# Patient Record
(demographics unavailable — no encounter records)

---

## 2024-11-25 NOTE — PROCEDURE
[FreeTextEntry1] : Procedure Performed: sclerotherapy.   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 He was planned for localized sclerotherapy injections. After explaining risks and benefits, informed consent was obtained. All questions answered.   Procedure Note: The patient was brought into the examination room and placed supine on procedure table. Left lower extremity was cleaned with isopropyl alcohol. 1% Asclera was injected using a sterile 1cc syringe with 30 gauge 1/2 inch needle. Sclerosant was injected into the left lower extremity symptomatic, reticular veins and injections were mostly localized to the medial ankle. At completion, treated area was dressed with an ace compression wrap. Patient tolerated the procedure well with no complications. Patient advised to wear compression stockings for 2 weeks.

## 2024-11-25 NOTE — PHYSICAL EXAM
[Normal Breath Sounds] : Normal breath sounds [Normal Rate and Rhythm] : normal rate and rhythm [2+] : left 2+ [Varicose Veins Of Lower Extremities] : bilaterally [Ankle Swelling On The Left] : moderate [No Rash or Lesion] : No rash or lesion [Alert] : alert [Calm] : calm [Ankle Swelling (On Exam)] : not present [] : not present [Skin Ulcer] : no ulcer [de-identified] : No acute distress noted [de-identified] : No palpable cords.  No calf tenderness. [de-identified] : Intact

## 2024-11-25 NOTE — PHYSICAL EXAM
[Normal Breath Sounds] : Normal breath sounds [Normal Rate and Rhythm] : normal rate and rhythm [2+] : left 2+ [Varicose Veins Of Lower Extremities] : bilaterally [Ankle Swelling On The Left] : moderate [No Rash or Lesion] : No rash or lesion [Alert] : alert [Calm] : calm [Ankle Swelling (On Exam)] : not present [] : not present [Skin Ulcer] : no ulcer [de-identified] : No acute distress noted [de-identified] : No palpable cords.  No calf tenderness. [de-identified] : Intact

## 2024-11-25 NOTE — HISTORY OF PRESENT ILLNESS
[FreeTextEntry1] : Patient is a 33-year-old male with history significant for type 2 diabetes who presents to the office today for evaluation of bleeding varicosities in the left lower extremity.  Patient reports several episodes of bleeding from the veins over the years with 3 occurring this month.  Patient was seen at [facility] ER 2 weeks ago. During this visit, area was dressed withTXA soaked gauze and Raghu.  Denies fever or chills.  Denies chest pain or claudication symptoms.  Denies tissue loss.  No history of MI or CVA.  No history of DVT or PE.  No history of smoking.

## 2024-11-25 NOTE — HISTORY OF PRESENT ILLNESS
[FreeTextEntry1] : Patient is a 33-year-old male with history significant for type 2 diabetes who presents to the office today for evaluation of bleeding varicosities in the left lower extremity.  Patient reports several episodes of bleeding from the veins over the years with 3 occurring this month.  Patient was seen at [facility] ER 2 weeks ago. During this visit, area was dressed withTXA soaked gauze and Raghu.  Denies fever or chills.  Denies chest pain or claudication symptoms.  Denies tissue loss.  No history of MI or CVA.  No history of DVT or PE.  No history of smoking.

## 2024-11-25 NOTE — ASSESSMENT
[FreeTextEntry1] : 33-year-old male with bleeding varicosities of the left lower extremity.  Pedal pulses are intact bilaterally.  There is no evidence of significant arterial sufficiency at this time.  Duplex demonstrates severe venous insufficiency throughout the left greater saphenous vein with no evidence of DVT or SVT. In the right lower extremity venous insufficiency is limited through the distal calf segments of the greater saphenous vein also with no evidence of DVT or SVT.   Patient with bleeding varicosities secondary to severe venous insufficiency that is now interfering with his daily life and refractory to conservative management. Treatment is indicated now for non-cosmetic reasons for symptomatic venous reflux disease. Recommend radiofrequency ablation of the left greater saphenous vein with possible stab phlebectomy.   The risks and benefits of endovenous ablation of saphenous vein versus conservative treatment was discussed with the patient. Patient chooses to proceed with the procedure. Treatment plan to be scheduled.

## 2025-01-02 NOTE — HISTORY OF PRESENT ILLNESS
[FreeTextEntry1] : Star presents to the office today for evaluation of chronic stomach issues.  His mother, who is a patient of this office, advised him to follow-up with GI.  The patient's main reason for the appointment today is that when he moves his bowels, it is a "biohazard" with a foul smell that permeates through the whole house.  In addition, the patient reports intermittent bloating.  The bloating does not appear related to oral intake and improves when he passes gas.  He has tried Gas-X in the past without relief.  The patient denies any dysphagia or nausea and moves his bowels almost daily.  In the past few days, he also notes nonradiating right-sided flank pain which is also not affected by his eating.  He denies any other abdominal pain.  The patient has a history of IDDM  and was diagnosed with diabetes about 4 years ago when his sugars were in the 700s.  He is currently taking insulin and was started on Ozempic last year.  He has not been taking Ozempic consistently but has noted more than 10 pound weight loss.

## 2025-01-02 NOTE — PHYSICAL EXAM
[Alert] : alert [Sclera] : the sclera and conjunctiva were normal [Hearing Threshold Finger Rub Not Boyle] : hearing was normal [Normal Appearance] : the appearance of the neck was normal [No Respiratory Distress] : no respiratory distress [Auscultation Breath Sounds / Voice Sounds] : lungs were clear to auscultation bilaterally [Heart Rate And Rhythm] : heart rate was normal and rhythm regular [Normal S1, S2] : normal S1 and S2 [None] : no edema [Bowel Sounds] : normal bowel sounds [Abdomen Tenderness] : non-tender [No Masses] : no abdominal mass palpated [Abdomen Soft] : soft [Supraclavicular Lymph Nodes Enlarged Bilaterally] : no supraclavicular lymphadenopathy [No CVA Tenderness] : no CVA  tenderness [Abnormal Walk] : normal gait [Normal Color / Pigmentation] : normal skin color and pigmentation [No Focal Deficits] : no focal deficits [Oriented To Time, Place, And Person] : oriented to person, place, and time

## 2025-01-02 NOTE — ASSESSMENT
[FreeTextEntry1] : 1.  Bloating, flatulence, foul-smelling stool.  At risk for SIBO given history of diabetes.  May be due to H. pylori or ingestion of FODMAPs.  Rule out celiac disease. 2.  Right flank pain.  Differential includes musculoskeletal etiology versus biliary colic. 3.  Morbid obesity (BMI: 63) on Ozempic. 4.  IDDM.  Recs:  - Routine labs, H. pylori breath test ordered. - Check RUQ ultrasound. - If H. pylori breath test negative, will consider testing and/or treatment for SIBO. - Patient was advised that in absence of red flag symptoms, endoscopic evaluation would likely be low yield for his current symptoms. - Patient was advised to use Ozempic consistently.  If he is not able to lose sufficient weight with diet and exercise alone, he was advised to consider evaluation with a bariatric surgeon.

## 2025-02-05 NOTE — ASSESSMENT
[Arterial/Venous Disease] : arterial/venous disease [FreeTextEntry1] : bleeding varicosities in the left lower extremity. Patient reports several episodes of bleeding from the veins over the years with 3 occurring this month. plan for radiofrequency ablation

## 2025-02-05 NOTE — REASON FOR VISIT
[Other ___] : a [unfilled] visit for [FreeTextEntry2] : LEFT LEG RADIOFREQUENCY ABLATION for venous insuffiency

## 2025-02-05 NOTE — HISTORY OF PRESENT ILLNESS
[FreeTextEntry1] : alert and oriented accompanied by wife Latonya Enriquez [phone]  feels ok no insulin today c/o bleeding from right leg varicose veins, went to [facility] ER, ankle wrapped [FreeTextEntry5] : 1000pm [FreeTextEntry6] : Dr Mehta

## 2025-02-05 NOTE — PAST MEDICAL HISTORY
[Varicose Veins] : Varicose Veins [No therapy indicated for cases scheduled for less than one hour] : No therapy indicated for cases scheduled for less than one hour. [Obesity: BMI >25] : Obesity: BMI >25 [FreeTextEntry1] : Malignant Hyperthermia Screening Tool and Risk of Bleeding Assessment  Mr. JORGITO CHILEL denies family history of unexpected death following Anesthesia or Exercise. Denies Family history of Malignant Hyperthermia, Muscle or Neuromuscular disorder and High Temperature following exercise.  Mr. JORGITO CHILEL denies history of Muscle Spasm, Dark or Chocolate - Colored urine and Unanticipated fever immediately following anesthesia or serious exercise.  Mr. CHILEL also denies bleeding tendencies/ Risks of Bleeding.

## 2025-02-05 NOTE — HISTORY OF PRESENT ILLNESS
[FreeTextEntry1] : alert and oriented accompanied by wife Latonya Enriquez [phone]  feels ok no insulin today c/o bleeding from right leg varicose veins, went to [facility] ER, ankle wrapped [FreeTextEntry5] : 1000pm [FreeTextEntry6] : Dr Mehta

## 2025-02-05 NOTE — HISTORY OF PRESENT ILLNESS
[FreeTextEntry1] : alert and oriented accompanied by wife Latonya Enriquez [phone]  feels ok no insulin today c/o bleeding from right leg varicose veins, went to [facility] ER, ankle wrapped [FreeTextEntry5] : 1000pm [FreeTextEntry6] : Dr Mehta

## 2025-02-05 NOTE — PROCEDURE
[D/C IV on discharge] : D/C IV on discharge [Resume diet] : resume diet [Dressing checked for bleeding] : Dressing checked for bleeding [Vital signs on admission the q 15 mins x2] : Vital signs on admission the q 15 mins x2 [FreeTextEntry1] : left leg radiofrequency ablation

## 2025-02-14 NOTE — REASON FOR VISIT
[de-identified] : Radiofrequency ablation of left greater saphenous vein [de-identified] : s/p ablation. Denies fever or chills. Denies chest pain or shortness of breath. Patient reports pain is well controlled.

## 2025-02-14 NOTE — DISCUSSION/SUMMARY
[FreeTextEntry1] : Duplex demonstrates successful ablation of left greater saphenous vein with no evidence of deep vein thrombosis.  Follow-up 1 month.

## 2025-03-12 NOTE — HISTORY OF PRESENT ILLNESS
[FreeTextEntry1] : Patient is a 33-year-old male with history significant for type 2 diabetes and bleeding varicosities now s/p radiofrequency ablation of left greater saphenous who presents to the office today for follow-up.  Denies fever or chills.  Denies chest pain or claudication symptoms.  Denies tissue loss.

## 2025-03-12 NOTE — ASSESSMENT
[FreeTextEntry1] : 33-year-old male with bleeding varicosities of the left lower extremity s/p radiofrequency ablation of left greater saphenous  Pedal pulses are intact bilaterally.  There is no evidence of significant arterial sufficiency at this time.  Duplex today shows venous insufficiency limited to the distal calf segment of the right greater saphenous vein with no evidence of deep vein thrombosis or superficial phlebitis.  Recommend conservative management with compression and leg elevation.  No further vascular intervention is required.  Patient to follow-up as needed.

## 2025-03-12 NOTE — PHYSICAL EXAM
[Normal Breath Sounds] : Normal breath sounds [Normal Rate and Rhythm] : normal rate and rhythm [2+] : left 2+ [Varicose Veins Of Lower Extremities] : bilaterally [Ankle Swelling On The Left] : moderate [No Rash or Lesion] : No rash or lesion [Alert] : alert [Calm] : calm [Ankle Swelling (On Exam)] : not present [] : not present [Skin Ulcer] : no ulcer [de-identified] : No acute distress noted [de-identified] : No palpable cords.  No calf tenderness. [de-identified] : Intact

## 2025-03-12 NOTE — PHYSICAL EXAM
[Normal Breath Sounds] : Normal breath sounds [Normal Rate and Rhythm] : normal rate and rhythm [2+] : left 2+ [Varicose Veins Of Lower Extremities] : bilaterally [Ankle Swelling On The Left] : moderate [No Rash or Lesion] : No rash or lesion [Alert] : alert [Calm] : calm [Ankle Swelling (On Exam)] : not present [] : not present [Skin Ulcer] : no ulcer [de-identified] : No acute distress noted [de-identified] : No palpable cords.  No calf tenderness. [de-identified] : Intact

## 2025-06-24 NOTE — ASSESSMENT
[Arterial/Venous Disease] : arterial/venous disease [FreeTextEntry1] : 33-year-old male with bleeding varicosities of the left lower extremity s/p radiofrequency ablation of left greater saphenous  Pedal pulses are intact bilaterally.  There is no evidence of significant arterial sufficiency at this time.  Duplex today shows venous insufficiency limited to the distal calf segment of the right greater saphenous vein with no evidence of deep vein thrombosis or superficial phlebitis.  Patient is status post sclerotherapy for bleeding reticular veins of the right lower extremity. Continue compression stockings and elevation.  Patient to follow-up in 6 weeks.

## 2025-06-24 NOTE — CONSULT LETTER
[Dear  ___] : Dear  [unfilled], [Consult Letter:] : I had the pleasure of evaluating your patient, [unfilled]. [Please see my note below.] : Please see my note below. [Consult Closing:] : Thank you very much for allowing me to participate in the care of this patient.  If you have any questions, please do not hesitate to contact me. [Sincerely,] : Sincerely, [FreeTextEntry3] : VALERIA Ladd MD

## 2025-06-24 NOTE — PHYSICAL EXAM
[Normal Breath Sounds] : Normal breath sounds [Normal Rate and Rhythm] : normal rate and rhythm [2+] : left 2+ [Varicose Veins Of Lower Extremities] : bilaterally [Ankle Swelling On The Left] : moderate [No Rash or Lesion] : No rash or lesion [Alert] : alert [Calm] : calm [Ankle Swelling (On Exam)] : not present [] : not present [Skin Ulcer] : no ulcer [de-identified] : No acute distress noted [de-identified] : No palpable cords.  No calf tenderness. [de-identified] : Intact

## 2025-06-24 NOTE — HISTORY OF PRESENT ILLNESS
[FreeTextEntry1] : Patient is a 34-year-old male with history significant for type 2 diabetes and bleeding varicosities now s/p radiofrequency ablation of left greater saphenous who presents to the office today for follow-up. Patient reports 2 episodes of bleeding varicosities in the right lower extremity.  Denies fever or chills.  Denies chest pain or claudication symptoms.  Denies tissue loss.

## 2025-06-24 NOTE — PROCEDURE
[FreeTextEntry1] :  Sclerotherapy Indications: This is a 34-year-old male with bleeding reticular veins of the right lower extremity.  He was planned for localized sclerotherapy injection. After explaining risks and benefits, informed consent was obtained. All questions answered.   Procedure Note: The patient was brought into the examination room and placed prone on procedure table.The rightlower extremity was cleaned with isopropyl alcohol. 1% Asclera was injected using a sterile 1cc syringe with 30 gauge 1/2 inch needle. Sclerosant was injected into the right lower extremity symptomatic, bleeding reticular veins and injections were mostly localized to the lateral lower calf. At completion, treated area was dressed with an ace compression wrap. Patient tolerated the procedure well with no complications. Patient advised to wear compression stockings for minimum of 2 to 3 weeks and refrain from direct exposure to sunlight.

## 2025-06-24 NOTE — PHYSICAL EXAM
[Normal Breath Sounds] : Normal breath sounds [Normal Rate and Rhythm] : normal rate and rhythm [2+] : left 2+ [Varicose Veins Of Lower Extremities] : bilaterally [Ankle Swelling On The Left] : moderate [No Rash or Lesion] : No rash or lesion [Alert] : alert [Calm] : calm [Ankle Swelling (On Exam)] : not present [] : not present [Skin Ulcer] : no ulcer [de-identified] : No acute distress noted [de-identified] : No palpable cords.  No calf tenderness. [de-identified] : Intact

## 2025-07-28 NOTE — HISTORY OF PRESENT ILLNESS
[FreeTextEntry1] : Patient is a 34-year-old male with history significant for type 2 diabetes and bleeding varicosities now s/p radiofrequency ablation of left greater saphenous who presents to the office today for follow-up s/p sclerotherapy for treatment of bleeding reticular veins. Patient denies any further episodes of bleeding veins.  Denies fever or chills.  Denies rest pain or claudication symptoms.  Denies tissue loss.

## 2025-07-28 NOTE — ASSESSMENT
[Arterial/Venous Disease] : arterial/venous disease [FreeTextEntry1] : 34-year-old male with bleeding varicosities of the left lower extremity s/p radiofrequency ablation of left greater saphenous  Pedal pulses are intact bilaterally. There is no current evidence of peripheral vascular disease.  Patient is status post sclerotherapy for bleeding reticular veins of the right lower extremity with no current issue. Continue compression stockings and elevation.  No further vascular intervention is required at this time.  Patient to follow-up as needed.

## 2025-07-28 NOTE — PHYSICAL EXAM
[Normal Breath Sounds] : Normal breath sounds [Normal Rate and Rhythm] : normal rate and rhythm [2+] : left 2+ [Varicose Veins Of Lower Extremities] : bilaterally [Ankle Swelling On The Left] : moderate [No Rash or Lesion] : No rash or lesion [Alert] : alert [Calm] : calm [Ankle Swelling (On Exam)] : not present [] : not present [Skin Ulcer] : no ulcer [de-identified] : No acute distress noted [de-identified] : No palpable cords.  No calf tenderness. [de-identified] : Intact